# Patient Record
Sex: MALE | Race: WHITE | Employment: FULL TIME | URBAN - METROPOLITAN AREA
[De-identification: names, ages, dates, MRNs, and addresses within clinical notes are randomized per-mention and may not be internally consistent; named-entity substitution may affect disease eponyms.]

---

## 2019-03-07 ENCOUNTER — APPOINTMENT (OUTPATIENT)
Dept: GENERAL RADIOLOGY | Age: 49
End: 2019-03-07
Payer: COMMERCIAL

## 2019-03-07 ENCOUNTER — HOSPITAL ENCOUNTER (EMERGENCY)
Age: 49
Discharge: HOME | End: 2019-03-07
Payer: COMMERCIAL

## 2019-03-07 VITALS
HEART RATE: 56 BPM | WEIGHT: 170 LBS | SYSTOLIC BLOOD PRESSURE: 105 MMHG | DIASTOLIC BLOOD PRESSURE: 64 MMHG | RESPIRATION RATE: 16 BRPM | HEIGHT: 72 IN | TEMPERATURE: 98.1 F | BODY MASS INDEX: 23.03 KG/M2 | OXYGEN SATURATION: 98 %

## 2019-03-07 DIAGNOSIS — M25.511 ACUTE PAIN OF RIGHT SHOULDER: Primary | ICD-10-CM

## 2019-03-07 DIAGNOSIS — M77.11 LATERAL EPICONDYLITIS OF RIGHT ELBOW: ICD-10-CM

## 2019-03-07 PROCEDURE — 99283 EMERGENCY DEPT VISIT LOW MDM: CPT

## 2019-03-07 PROCEDURE — 73030 X-RAY EXAM OF SHOULDER: CPT

## 2019-03-07 PROCEDURE — 6360000002 HC RX W HCPCS: Performed by: PHYSICIAN ASSISTANT

## 2019-03-07 PROCEDURE — 96372 THER/PROPH/DIAG INJ SC/IM: CPT

## 2019-03-07 RX ORDER — METHOCARBAMOL 500 MG/1
500 TABLET, FILM COATED ORAL 3 TIMES DAILY
Qty: 9 TABLET | Refills: 0 | Status: SHIPPED | OUTPATIENT
Start: 2019-03-07

## 2019-03-07 RX ORDER — KETOROLAC TROMETHAMINE 30 MG/ML
30 INJECTION, SOLUTION INTRAMUSCULAR; INTRAVENOUS ONCE
Status: COMPLETED | OUTPATIENT
Start: 2019-03-07 | End: 2019-03-07

## 2019-03-07 RX ORDER — NAPROXEN 500 MG/1
500 TABLET ORAL 2 TIMES DAILY PRN
Qty: 20 TABLET | Refills: 0 | Status: SHIPPED | OUTPATIENT
Start: 2019-03-07 | End: 2019-03-17

## 2019-03-07 RX ADMIN — KETOROLAC TROMETHAMINE 30 MG: 30 INJECTION, SOLUTION INTRAMUSCULAR; INTRAVENOUS at 18:02

## 2019-03-07 ASSESSMENT — PAIN DESCRIPTION - PAIN TYPE: TYPE: ACUTE PAIN

## 2019-03-07 ASSESSMENT — PAIN DESCRIPTION - ORIENTATION: ORIENTATION: RIGHT

## 2019-03-07 ASSESSMENT — PAIN DESCRIPTION - LOCATION: LOCATION: SHOULDER

## 2019-03-07 ASSESSMENT — PAIN SCALES - GENERAL: PAINLEVEL_OUTOF10: 9

## 2019-04-01 ENCOUNTER — HOSPITAL ENCOUNTER (OUTPATIENT)
Dept: MRI IMAGING | Age: 49
Discharge: HOME | End: 2019-04-01
Payer: COMMERCIAL

## 2019-04-01 DIAGNOSIS — S46.911A STRAIN OF RIGHT SHOULDER, INITIAL ENCOUNTER: ICD-10-CM

## 2019-04-01 PROCEDURE — 73221 MRI JOINT UPR EXTREM W/O DYE: CPT

## 2019-04-10 ENCOUNTER — HOSPITAL ENCOUNTER (OUTPATIENT)
Dept: PHYSICAL THERAPY | Age: 49
Setting detail: THERAPIES SERIES
Discharge: HOME | End: 2019-04-10
Payer: COMMERCIAL

## 2019-04-10 PROCEDURE — 97162 PT EVAL MOD COMPLEX 30 MIN: CPT

## 2019-04-10 PROCEDURE — 97016 VASOPNEUMATIC DEVICE THERAPY: CPT

## 2019-04-10 PROCEDURE — 97110 THERAPEUTIC EXERCISES: CPT

## 2019-04-10 ASSESSMENT — PAIN DESCRIPTION - LOCATION: LOCATION: SHOULDER;ARM

## 2019-04-10 ASSESSMENT — PAIN SCALES - GENERAL: PAINLEVEL_OUTOF10: 7

## 2019-04-10 ASSESSMENT — PAIN DESCRIPTION - ORIENTATION: ORIENTATION: RIGHT

## 2019-04-10 ASSESSMENT — PAIN DESCRIPTION - PAIN TYPE: TYPE: ACUTE PAIN

## 2019-04-10 ASSESSMENT — PAIN DESCRIPTION - FREQUENCY: FREQUENCY: CONTINUOUS

## 2019-04-10 NOTE — FLOWSHEET NOTE
Outpatient Physical Therapy  [place]           [x] Phone: [phone]   Fax: [phone]  Kimmy Luciano           [] Phone: [phone]   Fax: [phone]        Physical Therapy Daily Treatment Note  Date:  4/10/2019    Patient Name:  Esa Andre    :  1970  MRN: [id number]  Restrictions/Precautions: Other position/activity restrictions: None  Diagnosis:   Diagnosis: Right shoulder and upper arm strain  Date of Injury/Surgery:   Treatment Diagnosis: Treatment Diagnosis: Muscular weakness, decreased function, decreased right shoulder AROM, pain    Insurance/Certification information: PT Insurance Information: [facility] - Initially approved for EVAL only   Referring Physician:  Referring Practitioner: PING Araujo  Next Doctor Visit:    Plan of care signed (Y/N):N    Outcome Measure: Quick DASH  Visit# / total visits:  1 /10  Pain level: 7/10   Goals:          Long term goals  Time Frame for Long term goals :  All goals to be assessed by the 10th visit  Long term goal 1: Pt to be independent with HEP  Long term goal 2: Pt to increase right shoulder AROM  Long term goal 3: Pt to increase right shoulder strength  Long term goal 4: Pt to improve Quick DASH score    Summary of Evaluation: Assessment: Patient is a 52 yo Male who presents with Right shoulder and upper arm strain which impacts on ADL's, reaching out and overhead, working, dressing ;patient's goal is to get rid of the pain and get his arm back to normal ; PT to address patient's goals, impairments and activity limitations with skilled interventions checked in plan of care;patient's level of function prior to onset of Right shoulder and upper arm strain was WNL; did not observe any barriers to learning during PT eval; learning preferences include demonstration, practice, and handouts; patient expressed understanding of HEP; patient appears to be motivated to participate in an active PT program and to be compliant with HEP expectations;patient assisted in developing treatment plan and goals; no DME is currently being used; Subjective:  See eval         Any changes in Ambulatory Summary Sheet? None        Objective:  See eval           Exercises: (No more than 4 columns)   Exercise/Equipment Date 4/10/2019 (1) Date Date           WARM UP                     TABLE                                       STANDING      Pendulums circles (cw/ccw), fwd/bwd swings  x 5 reps                                              PROPRIOCEPTION                                    MODALITIES      VASO X 15 Min               Other Therapeutic Activities/Education:  Pt received education on their current pathology and how their condition affects functional activities. Pt understood discussion and questions were answered. Pt understands their activity limitations and understands rational for treatment progression. Home Exercise Program:  4/10/2019 HEP as above, copy to pt/chart      Manual Treatments: Trial of PROM, pt with poor tolerance       Modalities:  Patient received vasocompression on their Right Shoulder for pain and inflammation for 15 min on low pressure. Patient had negative skin reaction afterwards.          Communication with other providers:        Assessment:  (Response towards treatment session) (Pain Rating)    Assessment: Patient is a 52 yo Male who presents with Right shoulder and upper arm strain which impacts on ADL's, reaching out and overhead, working, dressing ;patient's goal is to get rid of the pain and get his arm back to normal ; PT to address patient's goals, impairments and activity limitations with skilled interventions checked in plan of care;patient's level of function prior to onset of Right shoulder and upper arm strain was WNL; did not observe any barriers to learning during PT eval; learning preferences include demonstration, practice, and handouts; patient expressed understanding of HEP; patient appears to be motivated to participate in an active PT program and to be compliant with HEP expectations;patient assisted in developing treatment plan and goals; no DME is currently being used;      Plan for Next Session: Specific instructions for Next Treatment: Plan to advance with ROM (AAROM, PROM, to AROM), strengthening (start with isometrics) and modalities as needed for pain control.       Time In / Time Out: 1445/1545          Timed Code/Total Treatment Minutes:  25/60; 1 PT Mod Eval, 1 TE (10), 1 VASO (15)      Next Progress Note due:        Plan of Care Interventions:  [x] Therapeutic Exercise             [] Aquatics:  [x] Therapeutic Activity               [] Ultrasound                  [x] Elec Stimulation  [] Gait Training                          [] Cervical Traction        [] Lumbar Traction  [x] Neuromuscular Re-education            [x] Cold/hotpack  [] Iontophoresis   [x] Instruction in HEP                                [x] Manual Therapy                                 [x] vasopneumatic            [] Self care home management        []Dry needling trigger point point/pain management                   Electronically signed by:  Sandrine Gagnon 4/10/2019, 4:05 PM

## 2019-04-10 NOTE — PLAN OF CARE
Outpatient Physical Therapy        [x] Phone: [phone]   Fax: [phone]   Pediatric Therapy          [] Phone: [phone]   Fax: [phone]  Pediatric [facility]          [] Phone: [phone]   Fax: [phone]      To: Referring Practitioner: Lauren Mckeon CNP) From: Lee Kellogg PT     Patient: Clay Martinez       : 1970  Diagnosis: Right shoulder and upper arm strain   Treatment Diagnosis: Muscular weakness, decreased function, decreased right shoulder AROM, pain   Date: 4/10/2019    Physical Therapy Certification/Re-Certification Form  Dear Hector Larsen CNP  The following patient has been evaluated for physical therapy services and for therapy to continue, Please review the attached evaluation and/or summary of the patient's plan of care, and verify that you agree therapy should continue by signing the attached document and sending it back to our office.     Eloisa Arriaga is a 50 yo Male who presents with Right shoulder and upper arm strain which impacts on ADL's, reaching out and overhead, working, dressing ;patient's goal is to get rid of the pain and get his arm back to normal ; PT to address patient's goals, impairments and activity limitations with skilled interventions checked in plan of care;patient's level of function prior to onset of Right shoulder and upper arm strain was WNL; did not observe any barriers to learning during PT eval; learning preferences include demonstration, practice, and handouts; patient expressed understanding of HEP; patient appears to be motivated to participate in an active PT program and to be compliant with HEP expectations;patient assisted in developing treatment plan and goals; no DME is currently being used          Planned Services:  [x] Therapeutic Exercise    [] Aquatics:  [x] Therapeutic Activity    [] Ultrasound  [x] Elec Stimulation  [] Gait Training     [] Cervical Traction [] Lumbar Traction  [x] Neuromuscular Re-education [x] Cold/hotpack [] Iontophoresis   [x] Instruction in HEP       [x] Manual Therapy     [x] vasopneumatic            [] Self care home management        []Dry needling trigger point point/pain management      Plan of Care Date Range:  4/10/2019 to 5/22/2019    ? Frequency/Duration:  # Days per week: [] 1 day # Weeks: [] 1 week [x] 5 weeks     [x] 2 days? [] 2 weeks [] 6 weeks     [] 3 days   [] 3 weeks [] 7 weeks     [] 4 days   [] 4 weeks [] 8 weeks    Rehab Potential: [] Excellent [] Good [x] Fair  [] Poor     Goals:     Long term goals  Time Frame for Long term goals : All goals to be assessed by the 10th visit  Long term goal 1: Pt to be independent with HEP  Long term goal 2: Pt to increase right shoulder AROM  Long term goal 3: Pt to increase right shoulder strength  Long term goal 4: Pt to improve Quick DASH score    Electronically signed by:  Reza Kemp PT, 4/10/2019, 4:04 PM          If you have any questions or concerns, please don't hesitate to call.   Thank you for your referral.    Physician Signature:_________________Date:____________Time: ________  By signing above, therapists plan is approved by physician

## 2019-04-10 NOTE — PROGRESS NOTES
Physical Therapy  Initial Assessment  Date: 4/10/2019  Patient Name: George Booth  MRN: [de-identified]  : 1970     Treatment Diagnosis: Muscular weakness, decreased function, decreased right shoulder AROM, pain    Restrictions  Position Activity Restriction  Other position/activity restrictions: None    Subjective   General  Chart Reviewed: Yes  Patient assessed for rehabilitation services?: Yes  Family / Caregiver Present: No  Referring Practitioner: PING Norris  Referral Date : 19  Diagnosis: Right shoulder and upper arm strain  Follows Commands: Within Functional Limits  General Comment  Comments: Pt is left hand dominant  PT Visit Information  Onset Date: 19  PT Insurance Information: [facility] - Initially approved for EVAL only  Subjective  Subjective: Pt is employed at [employer] and has been there for 2-3 years. Pt was cranking one of the trailer handle down and the handle snapped back really quick and jerked his shoulder really bad. He finished off that week but was hurting really bad. Pt states this happened at the yard and then he had the drive to [facility] and down to [place] and states he was hurting really bad by then. Pt states he has pain in the front/top and back of the shoulder then shoots pain down the back of his arm. Pain Screening  Patient Currently in Pain: Yes  Pain Assessment  Pain Assessment: 0-10  Pain Level: 7  Pain Type: Acute pain  Pain Location: Shoulder;Arm  Pain Orientation: Right  Pain Descriptors: Aching;Burning; Throbbing; No Kappa; Shooting; Stabbing;Tingling  Pain Frequency: Continuous  Non-Pharmaceutical Pain Intervention(s): Cold applied; Heat applied  Vital Signs  Patient Currently in Pain: Yes    Vision/Hearing  Vision  Vision: Within Functional Limits  Hearing  Hearing: Within functional limits    Orientation  Orientation  Overall Orientation Status: Within Normal Limits    Social/Functional History  Social/Functional History  Occupation: Full time employment  Type of occupation:     Objective     Observation/Palpation  Posture: Fair  Palpation: Tenderness noted in the right UT, supra, post [address] jt, ant [address] j[address], long head bicep, pect muscle, bicep muscle belly    AROM RUE (degrees)  RUE General AROM: Shoulder flex 76 abd 65 ER 0 IR 30 (rotation measured at 30 deg shld abd)   AROM LUE (degrees)  LUE General AROM: Shoulder flex 166 abd 171 ER 85 IR 45     Strength RLE  Comment: Shoulder flex 2/5 abd 2/5 ER 2-/5 IR 2-/5  Strength LLE  Comment: Shoulder flex 5/5 abd 5/5 ER 5/5 IR 5/5                      Assessment        Conditions Requiring Skilled Therapeutic Intervention  Assessment: Patient is a 52 yo Male who presents with Right shoulder and upper arm strain which impacts on ADL's, reaching out and overhead, working, dressing ;patient's goal is to get rid of the pain and get his arm back to normal ; PT to address patient's goals, impairments and activity limitations with skilled interventions checked in plan of care;patient's level of function prior to onset of Right shoulder and upper arm strain was WNL; did not observe any barriers to learning during PT eval; learning preferences include demonstration, practice, and handouts; patient expressed understanding of HEP; patient appears to be motivated to participate in an active PT program and to be compliant with HEP expectations;patient assisted in developing treatment plan and goals; no DME is currently being used;  Treatment Diagnosis: Muscular weakness, decreased function, decreased right shoulder AROM, pain  Prognosis: Fair  Decision Making: Medium Complexity  Exam: ROM, strength, palpation Quick DASH  Clinical Presentation: evolving  Patient Education: HEP, educated on the anatomy of the shoulder  Barriers to Learning: None  REQUIRES PT FOLLOW UP: Yes  Treatment Initiated : yes         Plan   Plan  Times per week: 2x's/wk  Plan weeks: 5 weeks  Specific instructions for Next Treatment: Plan to advance with ROM (AAROM, PROM, to AROM), strengthening (start with isometrics) and modalities as needed for pain control. Current Treatment Recommendations: Strengthening, Manual Therapy - Joint Manipulation, Patient/Caregiver Education & Training, ROM, Neuromuscular Re-education, Pain Management, Modalities, Functional Mobility Training, Manual Therapy - Soft Tissue Mobilization, Home Exercise Program           OutComes Score   Quick DASH = 46 points/79.5%         Goals  Long term goals  Time Frame for Long term goals :  All goals to be assessed by the 10th visit  Long term goal 1: Pt to be independent with HEP  Long term goal 2: Pt to increase right shoulder AROM  Long term goal 3: Pt to increase right shoulder strength  Long term goal 4: Pt to improve Quick DASH score       Therapy Time   Individual Concurrent Group Co-treatment   Time In 1445         Time Out 1545         Minutes 60         Timed Code Treatment Minutes: Tiffany Reed 303, PT

## 2019-05-20 NOTE — PRE-CERTIFICATION NOTE
APPROVED FOR 10 TOTAL VISITS FOR 2-3X/WK FOR 4-5 WKS APPROVED FROM 3/21/2019-6/30/2019 [facility]# [id number]